# Patient Record
Sex: FEMALE | Employment: PART TIME | URBAN - METROPOLITAN AREA
[De-identification: names, ages, dates, MRNs, and addresses within clinical notes are randomized per-mention and may not be internally consistent; named-entity substitution may affect disease eponyms.]

---

## 2024-11-26 ENCOUNTER — PATIENT OUTREACH (OUTPATIENT)
Dept: HEMATOLOGY/ONCOLOGY | Facility: HOSPITAL | Age: 59
End: 2024-11-26
Payer: COMMERCIAL

## 2024-11-26 NOTE — PROGRESS NOTES
RN attempted to call patient to perform patient outreach/navigation since patient is new to thoracic provider. No answer at this time. RN will attempt to call back if time permit later today.

## 2024-11-27 ENCOUNTER — OFFICE VISIT (OUTPATIENT)
Dept: HEMATOLOGY/ONCOLOGY | Facility: HOSPITAL | Age: 59
End: 2024-11-27
Payer: COMMERCIAL

## 2024-11-27 VITALS
RESPIRATION RATE: 18 BRPM | BODY MASS INDEX: 24.8 KG/M2 | TEMPERATURE: 99.1 F | DIASTOLIC BLOOD PRESSURE: 67 MMHG | SYSTOLIC BLOOD PRESSURE: 146 MMHG | HEIGHT: 63 IN | WEIGHT: 139.99 LBS | OXYGEN SATURATION: 100 % | HEART RATE: 97 BPM

## 2024-11-27 DIAGNOSIS — C34.12 SMALL CELL LUNG CANCER, LEFT UPPER LOBE (MULTI): ICD-10-CM

## 2024-11-27 PROCEDURE — 3008F BODY MASS INDEX DOCD: CPT | Performed by: INTERNAL MEDICINE

## 2024-11-27 PROCEDURE — 99205 OFFICE O/P NEW HI 60 MIN: CPT | Performed by: INTERNAL MEDICINE

## 2024-11-27 PROCEDURE — 99215 OFFICE O/P EST HI 40 MIN: CPT | Performed by: INTERNAL MEDICINE

## 2024-11-27 ASSESSMENT — ENCOUNTER SYMPTOMS
NECK PAIN: 0
BRUISES/BLEEDS EASILY: 0
WHEEZING: 0
NERVOUS/ANXIOUS: 0
SORE THROAT: 0
ABDOMINAL DISTENTION: 0
SCLERAL ICTERUS: 0
NAUSEA: 0
DIFFICULTY URINATING: 0
CONFUSION: 0
DECREASED CONCENTRATION: 0
SEIZURES: 0
DIARRHEA: 1
NECK STIFFNESS: 0
RECTAL PAIN: 0
HEADACHES: 0
DIZZINESS: 0
DIAPHORESIS: 0
DYSURIA: 0
LIGHT-HEADEDNESS: 0
SLEEP DISTURBANCE: 0
TROUBLE SWALLOWING: 0
NUMBNESS: 0
ABDOMINAL PAIN: 0
APPETITE CHANGE: 0
UNEXPECTED WEIGHT CHANGE: 0
MYALGIAS: 0
PALPITATIONS: 0
ADENOPATHY: 0
BLOOD IN STOOL: 0
HEMOPTYSIS: 0
VOICE CHANGE: 0
HEMATURIA: 0
FREQUENCY: 0
FLANK PAIN: 0
EXTREMITY WEAKNESS: 0
EYE PROBLEMS: 0
COUGH: 0
VOMITING: 0
LEG SWELLING: 0
ARTHRALGIAS: 0
SHORTNESS OF BREATH: 0
CHEST TIGHTNESS: 0
SPEECH DIFFICULTY: 0
CHILLS: 0
BACK PAIN: 0
DEPRESSION: 0
FATIGUE: 0
CONSTIPATION: 0
FEVER: 0

## 2024-11-27 ASSESSMENT — NCCN CANCER DISTRESS MANAGEMENT
NCCN PRACTICAL CONCERNS: 12
NCCN EMOTIONAL CONCERNS: 5
NCCN PHYSICAL CONCERNS: 2
NCCN EMOTIONAL CONCERNS: 1

## 2024-11-27 ASSESSMENT — PAIN SCALES - GENERAL: PAINLEVEL_OUTOF10: 0-NO PAIN

## 2024-11-27 ASSESSMENT — PATIENT HEALTH QUESTIONNAIRE - PHQ9
2. FEELING DOWN, DEPRESSED OR HOPELESS: NOT AT ALL
1. LITTLE INTEREST OR PLEASURE IN DOING THINGS: NOT AT ALL
SUM OF ALL RESPONSES TO PHQ9 QUESTIONS 1 AND 2: 0
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
